# Patient Record
Sex: MALE | Race: WHITE | NOT HISPANIC OR LATINO | Employment: FULL TIME | URBAN - METROPOLITAN AREA
[De-identification: names, ages, dates, MRNs, and addresses within clinical notes are randomized per-mention and may not be internally consistent; named-entity substitution may affect disease eponyms.]

---

## 2023-03-08 DIAGNOSIS — E03.9 HYPOTHYROIDISM, UNSPECIFIED TYPE: ICD-10-CM

## 2023-03-08 RX ORDER — LEVOTHYROXINE SODIUM 200 UG/1
200 TABLET ORAL DAILY
COMMUNITY
Start: 2014-09-10 | End: 2023-03-08 | Stop reason: SDUPTHER

## 2023-03-09 RX ORDER — LEVOTHYROXINE SODIUM 200 UG/1
200 TABLET ORAL DAILY
Qty: 90 TABLET | Refills: 0 | Status: SHIPPED | OUTPATIENT
Start: 2023-03-09 | End: 2023-04-06 | Stop reason: SDUPTHER

## 2023-04-06 DIAGNOSIS — E03.9 HYPOTHYROIDISM, UNSPECIFIED TYPE: ICD-10-CM

## 2023-04-07 RX ORDER — LEVOTHYROXINE SODIUM 200 UG/1
200 TABLET ORAL DAILY
Qty: 90 TABLET | Refills: 2 | Status: SHIPPED | OUTPATIENT
Start: 2023-04-07 | End: 2023-04-07

## 2023-10-03 ENCOUNTER — PHARMACY VISIT (OUTPATIENT)
Dept: PHARMACY | Facility: CLINIC | Age: 63
End: 2023-10-03
Payer: COMMERCIAL

## 2023-10-03 PROCEDURE — RXMED WILLOW AMBULATORY MEDICATION CHARGE

## 2023-10-26 ENCOUNTER — PHARMACY VISIT (OUTPATIENT)
Dept: PHARMACY | Facility: CLINIC | Age: 63
End: 2023-10-26
Payer: COMMERCIAL

## 2023-10-26 DIAGNOSIS — J06.9 UPPER RESPIRATORY TRACT INFECTION, UNSPECIFIED TYPE: Primary | ICD-10-CM

## 2023-10-26 DIAGNOSIS — J06.9 UPPER RESPIRATORY TRACT INFECTION, UNSPECIFIED TYPE: ICD-10-CM

## 2023-10-26 PROCEDURE — RXMED WILLOW AMBULATORY MEDICATION CHARGE

## 2023-10-26 RX ORDER — AMOXICILLIN AND CLAVULANATE POTASSIUM 875; 125 MG/1; MG/1
875 TABLET, FILM COATED ORAL 2 TIMES DAILY
Qty: 14 TABLET | Refills: 0 | Status: SHIPPED | OUTPATIENT
Start: 2023-10-26 | End: 2023-11-02

## 2023-11-11 ENCOUNTER — PHARMACY VISIT (OUTPATIENT)
Dept: PHARMACY | Facility: CLINIC | Age: 63
End: 2023-11-11
Payer: COMMERCIAL

## 2023-11-12 ENCOUNTER — PHARMACY VISIT (OUTPATIENT)
Dept: PHARMACY | Facility: CLINIC | Age: 63
End: 2023-11-12
Payer: COMMERCIAL

## 2023-11-12 PROCEDURE — RXMED WILLOW AMBULATORY MEDICATION CHARGE

## 2023-12-24 DIAGNOSIS — E03.9 HYPOTHYROIDISM, UNSPECIFIED TYPE: ICD-10-CM

## 2023-12-26 PROCEDURE — RXMED WILLOW AMBULATORY MEDICATION CHARGE

## 2023-12-26 RX ORDER — LEVOTHYROXINE SODIUM 200 UG/1
200 TABLET ORAL DAILY
Qty: 90 TABLET | Refills: 0 | Status: SHIPPED | OUTPATIENT
Start: 2023-12-26 | End: 2024-01-18 | Stop reason: SDUPTHER

## 2023-12-27 ENCOUNTER — PHARMACY VISIT (OUTPATIENT)
Dept: PHARMACY | Facility: CLINIC | Age: 63
End: 2023-12-27
Payer: COMMERCIAL

## 2024-01-02 DIAGNOSIS — I10 PRIMARY HYPERTENSION: Primary | ICD-10-CM

## 2024-01-02 PROCEDURE — RXMED WILLOW AMBULATORY MEDICATION CHARGE

## 2024-01-02 RX ORDER — LISINOPRIL 20 MG/1
20 TABLET ORAL DAILY
Qty: 30 TABLET | Refills: 0 | Status: SHIPPED | OUTPATIENT
Start: 2024-01-02 | End: 2024-01-18 | Stop reason: SDUPTHER

## 2024-01-03 ENCOUNTER — PHARMACY VISIT (OUTPATIENT)
Dept: PHARMACY | Facility: CLINIC | Age: 64
End: 2024-01-03
Payer: COMMERCIAL

## 2024-01-03 DIAGNOSIS — E03.9 HYPOTHYROIDISM, UNSPECIFIED TYPE: ICD-10-CM

## 2024-01-03 DIAGNOSIS — I10 PRIMARY HYPERTENSION: ICD-10-CM

## 2024-01-03 RX ORDER — ATORVASTATIN CALCIUM 20 MG/1
20 TABLET, FILM COATED ORAL DAILY
Qty: 90 TABLET | Refills: 3 | OUTPATIENT
Start: 2024-01-03 | End: 2025-01-01

## 2024-01-03 RX ORDER — LISINOPRIL 20 MG/1
20 TABLET ORAL DAILY
Qty: 30 TABLET | Refills: 0 | OUTPATIENT
Start: 2024-01-03 | End: 2025-01-01

## 2024-01-03 RX ORDER — LEVOTHYROXINE SODIUM 200 UG/1
200 TABLET ORAL DAILY
Qty: 90 TABLET | Refills: 0 | OUTPATIENT
Start: 2024-01-03 | End: 2025-01-02

## 2024-01-18 ENCOUNTER — PHARMACY VISIT (OUTPATIENT)
Dept: PHARMACY | Facility: CLINIC | Age: 64
End: 2024-01-18
Payer: COMMERCIAL

## 2024-01-18 ENCOUNTER — OFFICE VISIT (OUTPATIENT)
Dept: PRIMARY CARE | Facility: CLINIC | Age: 64
End: 2024-01-18
Payer: COMMERCIAL

## 2024-01-18 VITALS
BODY MASS INDEX: 23.98 KG/M2 | SYSTOLIC BLOOD PRESSURE: 127 MMHG | DIASTOLIC BLOOD PRESSURE: 76 MMHG | OXYGEN SATURATION: 96 % | HEART RATE: 86 BPM | WEIGHT: 177 LBS | HEIGHT: 72 IN

## 2024-01-18 DIAGNOSIS — E03.9 HYPOTHYROIDISM, UNSPECIFIED TYPE: ICD-10-CM

## 2024-01-18 DIAGNOSIS — E55.9 MILD VITAMIN D DEFICIENCY: ICD-10-CM

## 2024-01-18 DIAGNOSIS — R73.01 IMPAIRED FASTING GLUCOSE: ICD-10-CM

## 2024-01-18 DIAGNOSIS — Z12.5 PROSTATE CANCER SCREENING: ICD-10-CM

## 2024-01-18 DIAGNOSIS — Z12.11 COLON CANCER SCREENING: ICD-10-CM

## 2024-01-18 DIAGNOSIS — E78.5 DYSLIPIDEMIA: ICD-10-CM

## 2024-01-18 DIAGNOSIS — K40.90 INGUINAL HERNIA OF RIGHT SIDE WITHOUT OBSTRUCTION OR GANGRENE: ICD-10-CM

## 2024-01-18 DIAGNOSIS — I10 PRIMARY HYPERTENSION: Primary | ICD-10-CM

## 2024-01-18 PROCEDURE — RXMED WILLOW AMBULATORY MEDICATION CHARGE

## 2024-01-18 PROCEDURE — 99214 OFFICE O/P EST MOD 30 MIN: CPT | Performed by: STUDENT IN AN ORGANIZED HEALTH CARE EDUCATION/TRAINING PROGRAM

## 2024-01-18 PROCEDURE — 3074F SYST BP LT 130 MM HG: CPT | Performed by: STUDENT IN AN ORGANIZED HEALTH CARE EDUCATION/TRAINING PROGRAM

## 2024-01-18 PROCEDURE — 3008F BODY MASS INDEX DOCD: CPT | Performed by: STUDENT IN AN ORGANIZED HEALTH CARE EDUCATION/TRAINING PROGRAM

## 2024-01-18 PROCEDURE — 3078F DIAST BP <80 MM HG: CPT | Performed by: STUDENT IN AN ORGANIZED HEALTH CARE EDUCATION/TRAINING PROGRAM

## 2024-01-18 PROCEDURE — G2211 COMPLEX E/M VISIT ADD ON: HCPCS | Performed by: STUDENT IN AN ORGANIZED HEALTH CARE EDUCATION/TRAINING PROGRAM

## 2024-01-18 RX ORDER — LISINOPRIL 20 MG/1
20 TABLET ORAL DAILY
Qty: 90 TABLET | Refills: 3 | Status: SHIPPED | OUTPATIENT
Start: 2024-01-18 | End: 2025-01-16

## 2024-01-18 RX ORDER — LEVOTHYROXINE SODIUM 200 UG/1
200 TABLET ORAL DAILY
Qty: 90 TABLET | Refills: 3 | Status: SHIPPED | OUTPATIENT
Start: 2024-01-18 | End: 2025-01-17

## 2024-01-18 RX ORDER — ATORVASTATIN CALCIUM 20 MG/1
20 TABLET, FILM COATED ORAL DAILY
Qty: 90 TABLET | Refills: 3 | Status: SHIPPED | OUTPATIENT
Start: 2024-01-18 | End: 2024-06-03 | Stop reason: DRUGHIGH

## 2024-01-29 ENCOUNTER — PHARMACY VISIT (OUTPATIENT)
Dept: PHARMACY | Facility: CLINIC | Age: 64
End: 2024-01-29
Payer: COMMERCIAL

## 2024-01-29 ENCOUNTER — LAB (OUTPATIENT)
Dept: LAB | Facility: LAB | Age: 64
End: 2024-01-29
Payer: COMMERCIAL

## 2024-01-29 DIAGNOSIS — E55.9 MILD VITAMIN D DEFICIENCY: ICD-10-CM

## 2024-01-29 DIAGNOSIS — R73.01 IMPAIRED FASTING GLUCOSE: ICD-10-CM

## 2024-01-29 DIAGNOSIS — E03.9 HYPOTHYROIDISM, UNSPECIFIED TYPE: ICD-10-CM

## 2024-01-29 DIAGNOSIS — E78.5 DYSLIPIDEMIA: ICD-10-CM

## 2024-01-29 DIAGNOSIS — Z12.11 COLON CANCER SCREENING: ICD-10-CM

## 2024-01-29 DIAGNOSIS — Z12.5 PROSTATE CANCER SCREENING: ICD-10-CM

## 2024-01-29 LAB
25(OH)D3 SERPL-MCNC: 48 NG/ML (ref 30–100)
ALBUMIN SERPL BCP-MCNC: 4.6 G/DL (ref 3.4–5)
ALP SERPL-CCNC: 55 U/L (ref 33–136)
ALT SERPL W P-5'-P-CCNC: 29 U/L (ref 7–52)
ANION GAP SERPL CALC-SCNC: 13 MMOL/L (ref 10–20)
AST SERPL W P-5'-P-CCNC: 22 U/L (ref 9–39)
BILIRUB SERPL-MCNC: 0.5 MG/DL (ref 0–1.2)
BUN SERPL-MCNC: 21 MG/DL (ref 6–23)
CALCIUM SERPL-MCNC: 10.9 MG/DL (ref 8.6–10.6)
CHLORIDE SERPL-SCNC: 101 MMOL/L (ref 98–107)
CHOLEST SERPL-MCNC: 161 MG/DL (ref 0–199)
CHOLESTEROL/HDL RATIO: 2.9
CO2 SERPL-SCNC: 29 MMOL/L (ref 21–32)
CREAT SERPL-MCNC: 1.13 MG/DL (ref 0.5–1.3)
EGFRCR SERPLBLD CKD-EPI 2021: 54 ML/MIN/1.73M*2
ERYTHROCYTE [DISTWIDTH] IN BLOOD BY AUTOMATED COUNT: 12.5 % (ref 11.5–14.5)
EST. AVERAGE GLUCOSE BLD GHB EST-MCNC: 100 MG/DL
GLUCOSE SERPL-MCNC: 102 MG/DL (ref 74–99)
HBA1C MFR BLD: 5.1 %
HCT VFR BLD AUTO: 43.9 % (ref 36–52)
HDLC SERPL-MCNC: 55.5 MG/DL
HGB BLD-MCNC: 14.8 G/DL (ref 12–17.5)
LDLC SERPL CALC-MCNC: 86 MG/DL
MCH RBC QN AUTO: 31.8 PG (ref 26–34)
MCHC RBC AUTO-ENTMCNC: 33.7 G/DL (ref 32–36)
MCV RBC AUTO: 94 FL (ref 80–100)
NON HDL CHOLESTEROL: 106 MG/DL (ref 0–149)
NRBC BLD-RTO: 0 /100 WBCS (ref 0–0)
PLATELET # BLD AUTO: 271 X10*3/UL (ref 150–450)
POTASSIUM SERPL-SCNC: 4.8 MMOL/L (ref 3.5–5.3)
PROT SERPL-MCNC: 7.3 G/DL (ref 6.4–8.2)
RBC # BLD AUTO: 4.66 X10*6/UL (ref 4–5.9)
SODIUM SERPL-SCNC: 138 MMOL/L (ref 136–145)
TRIGL SERPL-MCNC: 96 MG/DL (ref 0–149)
TSH SERPL-ACNC: 1.17 MIU/L (ref 0.44–3.98)
VLDL: 19 MG/DL (ref 0–40)
WBC # BLD AUTO: 5.7 X10*3/UL (ref 4.4–11.3)

## 2024-01-29 PROCEDURE — RXMED WILLOW AMBULATORY MEDICATION CHARGE

## 2024-01-29 PROCEDURE — 82306 VITAMIN D 25 HYDROXY: CPT

## 2024-01-29 PROCEDURE — 84154 ASSAY OF PSA FREE: CPT

## 2024-01-29 PROCEDURE — 83036 HEMOGLOBIN GLYCOSYLATED A1C: CPT

## 2024-01-29 PROCEDURE — 85027 COMPLETE CBC AUTOMATED: CPT

## 2024-01-29 PROCEDURE — 36415 COLL VENOUS BLD VENIPUNCTURE: CPT

## 2024-01-29 PROCEDURE — 84443 ASSAY THYROID STIM HORMONE: CPT

## 2024-01-29 PROCEDURE — 80053 COMPREHEN METABOLIC PANEL: CPT

## 2024-01-29 PROCEDURE — 84153 ASSAY OF PSA TOTAL: CPT

## 2024-01-29 PROCEDURE — 80061 LIPID PANEL: CPT

## 2024-01-29 RX ORDER — POLYETHYLENE GLYCOL 3350, SODIUM SULFATE ANHYDROUS, SODIUM BICARBONATE, SODIUM CHLORIDE, POTASSIUM CHLORIDE 236; 22.74; 6.74; 5.86; 2.97 G/4L; G/4L; G/4L; G/4L; G/4L
4000 POWDER, FOR SOLUTION ORAL ONCE
Qty: 4000 ML | Refills: 0 | Status: SHIPPED | OUTPATIENT
Start: 2024-01-29 | End: 2024-01-30

## 2024-01-31 LAB
PSA FREE MFR SERPL: 33 %
PSA FREE SERPL-MCNC: 0.2 NG/ML
PSA SERPL IA-MCNC: 0.6 NG/ML (ref 0–4)

## 2024-01-31 PROCEDURE — RXMED WILLOW AMBULATORY MEDICATION CHARGE

## 2024-02-01 ENCOUNTER — PHARMACY VISIT (OUTPATIENT)
Dept: PHARMACY | Facility: CLINIC | Age: 64
End: 2024-02-01
Payer: COMMERCIAL

## 2024-02-05 ENCOUNTER — PHARMACY VISIT (OUTPATIENT)
Dept: PHARMACY | Facility: CLINIC | Age: 64
End: 2024-02-05
Payer: COMMERCIAL

## 2024-02-05 PROCEDURE — RXMED WILLOW AMBULATORY MEDICATION CHARGE

## 2024-02-05 RX ORDER — AMOXICILLIN 500 MG/1
CAPSULE ORAL
Qty: 20 CAPSULE | Refills: 3 | OUTPATIENT
Start: 2024-01-15

## 2024-02-15 ENCOUNTER — APPOINTMENT (OUTPATIENT)
Dept: SURGERY | Facility: CLINIC | Age: 64
End: 2024-02-15
Payer: COMMERCIAL

## 2024-02-22 ENCOUNTER — OFFICE VISIT (OUTPATIENT)
Dept: SURGERY | Facility: CLINIC | Age: 64
End: 2024-02-22
Payer: COMMERCIAL

## 2024-02-22 VITALS
TEMPERATURE: 98 F | SYSTOLIC BLOOD PRESSURE: 126 MMHG | HEART RATE: 78 BPM | DIASTOLIC BLOOD PRESSURE: 83 MMHG | BODY MASS INDEX: 23.91 KG/M2 | WEIGHT: 176.3 LBS

## 2024-02-22 DIAGNOSIS — K40.90 INGUINAL HERNIA OF RIGHT SIDE WITHOUT OBSTRUCTION OR GANGRENE: Primary | ICD-10-CM

## 2024-02-22 PROCEDURE — 1036F TOBACCO NON-USER: CPT | Performed by: SURGERY

## 2024-02-22 PROCEDURE — 3008F BODY MASS INDEX DOCD: CPT | Performed by: SURGERY

## 2024-02-22 PROCEDURE — 99213 OFFICE O/P EST LOW 20 MIN: CPT | Performed by: SURGERY

## 2024-02-22 RX ORDER — ASPIRIN 81 MG/1
TABLET ORAL
COMMUNITY

## 2024-02-22 ASSESSMENT — PAIN SCALES - GENERAL: PAINLEVEL: 0-NO PAIN

## 2024-02-22 NOTE — PROGRESS NOTES
"History Of Present Illness  Gurdeep Tracy \"Narayan\" is a 63 y.o. adult presenting with right inguinal hernia.  He works at  as art .  He is notices bulge over the last several months.  Only occasionally gives him difficulty with pain.  When he is bending over.  He has a colonoscopy scheduled.  He has no prostate symptoms.  No previous abdominal surgeries.  Otherwise good health.        Last Recorded Vitals  Blood pressure 126/83, pulse 78, temperature 36.7 °C (98 °F), weight 80 kg (176 lb 4.8 oz).  Physical Examination  Awake and alert normal respiration.  Abdomen is benign with no scars.  He has a right inguinal hernia.  No evidence of a left inguinal hernia.    Assessment/Plan right inguinal hernia.  I reviewed the hernia booklets with him.  Will plan a laparoscopic right inguinal hernia at his convenience.  Presently he is can wait and see when his good time.  There is no problems with waiting for period of time till he has more symptoms.  He will contact me when he wants to proceed with surgery.    Mauricio Delgado MD FACS  Professor of Surgery  Soledad Montez Chair in Surgical [address]  [facility] [employer]  [id number] [place] [address], [id number]  Phone [phone]  email: [email or web address]        "

## 2024-03-31 PROCEDURE — RXMED WILLOW AMBULATORY MEDICATION CHARGE

## 2024-04-01 ENCOUNTER — PHARMACY VISIT (OUTPATIENT)
Dept: PHARMACY | Facility: CLINIC | Age: 64
End: 2024-04-01
Payer: COMMERCIAL

## 2024-04-23 ENCOUNTER — HOSPITAL ENCOUNTER (OUTPATIENT)
Dept: RADIOLOGY | Facility: HOSPITAL | Age: 64
Discharge: HOME | End: 2024-04-23
Payer: COMMERCIAL

## 2024-04-23 DIAGNOSIS — E78.5 DYSLIPIDEMIA: ICD-10-CM

## 2024-04-23 PROCEDURE — 75571 CT HRT W/O DYE W/CA TEST: CPT

## 2024-04-30 PROCEDURE — RXMED WILLOW AMBULATORY MEDICATION CHARGE

## 2024-05-01 ENCOUNTER — PHARMACY VISIT (OUTPATIENT)
Dept: PHARMACY | Facility: CLINIC | Age: 64
End: 2024-05-01
Payer: COMMERCIAL

## 2024-05-03 ENCOUNTER — OFFICE VISIT (OUTPATIENT)
Dept: GASTROENTEROLOGY | Facility: EXTERNAL LOCATION | Age: 64
End: 2024-05-03
Payer: COMMERCIAL

## 2024-05-03 DIAGNOSIS — Z12.11 COLON CANCER SCREENING: Primary | ICD-10-CM

## 2024-05-03 DIAGNOSIS — Z86.010 HX OF COLONIC POLYPS: ICD-10-CM

## 2024-05-03 DIAGNOSIS — Z12.11 COLON CANCER SCREENING: ICD-10-CM

## 2024-05-03 DIAGNOSIS — Z80.0 FAMILY HISTORY OF COLON CANCER: ICD-10-CM

## 2024-05-03 PROCEDURE — 3008F BODY MASS INDEX DOCD: CPT | Performed by: INTERNAL MEDICINE

## 2024-05-03 PROCEDURE — 45378 DIAGNOSTIC COLONOSCOPY: CPT | Performed by: INTERNAL MEDICINE

## 2024-05-03 NOTE — PROGRESS NOTES
Patient had colonoscopy for history of polyps and family history showing no recurrent polyps.  He should have a repeat in 5 years.

## 2024-05-20 PROCEDURE — RXMED WILLOW AMBULATORY MEDICATION CHARGE

## 2024-05-21 ENCOUNTER — PHARMACY VISIT (OUTPATIENT)
Dept: PHARMACY | Facility: CLINIC | Age: 64
End: 2024-05-21
Payer: COMMERCIAL

## 2024-06-03 ENCOUNTER — OFFICE VISIT (OUTPATIENT)
Dept: CARDIOLOGY | Facility: HOSPITAL | Age: 64
End: 2024-06-03
Payer: COMMERCIAL

## 2024-06-03 VITALS
SYSTOLIC BLOOD PRESSURE: 125 MMHG | DIASTOLIC BLOOD PRESSURE: 80 MMHG | BODY MASS INDEX: 23.54 KG/M2 | HEART RATE: 70 BPM | OXYGEN SATURATION: 96 % | HEIGHT: 72 IN | WEIGHT: 173.8 LBS

## 2024-06-03 DIAGNOSIS — R55 SYNCOPE AND COLLAPSE: Primary | ICD-10-CM

## 2024-06-03 DIAGNOSIS — E78.5 DYSLIPIDEMIA: ICD-10-CM

## 2024-06-03 PROCEDURE — 99214 OFFICE O/P EST MOD 30 MIN: CPT | Performed by: INTERNAL MEDICINE

## 2024-06-03 PROCEDURE — 93005 ELECTROCARDIOGRAM TRACING: CPT | Performed by: INTERNAL MEDICINE

## 2024-06-03 PROCEDURE — 3008F BODY MASS INDEX DOCD: CPT | Performed by: INTERNAL MEDICINE

## 2024-06-03 PROCEDURE — 1036F TOBACCO NON-USER: CPT | Performed by: INTERNAL MEDICINE

## 2024-06-03 RX ORDER — ATORVASTATIN CALCIUM 20 MG/1
10 TABLET, FILM COATED ORAL DAILY
Qty: 180 TABLET | Refills: 0 | Status: SHIPPED | OUTPATIENT
Start: 2024-06-03 | End: 2025-06-02

## 2024-06-03 ASSESSMENT — PAIN SCALES - GENERAL: PAINLEVEL: 0-NO PAIN

## 2024-06-03 NOTE — PROGRESS NOTES
Chief Complaint:   Shortness of Breath and Syncope (Passed out on Friday.)     History Of Present Illness:    Narayan Tracy is a 63 y.o. adult presenting with history of syncope.  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  He remembers standing up but does not remember actually falling having hit his head with a small hematoma.  His wife came came around immediately, to find him sitting up, trying to gather himself.  Within the few minutes he is back to his usual self.  His lisinopril dose was reduced to 10 mg daily from 20 mg,     He admits to not doing regular workouts as he used to do in the past and has some general tiredness.  He is hypothyroid on replacement therapy within normal TSH.     Last Recorded Vitals:  Vitals:    06/03/24 1526   BP: 125/80   BP Location: Left arm   Patient Position: Sitting   Pulse: 70   SpO2: 96%   Weight: 78.8 kg (173 lb 12.8 oz)   Height: 1.829 m (6')       Past Medical History:  He has a past medical history of Other amnesia (04/28/2017), Other specified abnormal findings of blood chemistry (04/28/2017), Recurrent acute iridocyclitis, unspecified eye, Scoliosis, unspecified (04/28/2017), Syncope and collapse (04/28/2017), and Unspecified atrial fibrillation (Multi) (04/28/2017).    Past Surgical History:  He has no past surgical history on file.      Social History:  He reports that he has quit smoking. His smoking use included cigarettes. He has never been exposed to tobacco smoke. He has never used smokeless tobacco. He reports current alcohol use. He reports that he does not use drugs.    Family History:  No family history on file.     Allergies:  Patient has no known allergies.    Outpatient Medications:  Current Outpatient Medications   Medication Instructions    amoxicillin (Amoxil) 500 mg capsule Take 4  capsules by mouth 1 hour before appointment    aspirin 81 mg EC tablet No dose, route, or frequency recorded.    atorvastatin (LIPITOR) 10 mg, oral, Daily    levothyroxine (Synthroid, Levoxyl) 200 mcg tablet TAKE 1 TABLET BY MOUTH ONCE DAILY    lisinopril 20 mg tablet TAKE ONE TABLET BY MOUTH DAILY     Vitals:  HR 70 /80 Wt 78.8 kg/ 173 lb BMI 23.57    Physical Exam:  On examination, he was comfortably sitting.  There was no postural fall in blood pressure HEENT: normal, Neck: supple, Carotids: brisk upstroke, JVP: normal, CVS: Rate and rhythm regular, S1S2, Chest: CTAB, Abdomen: soft nontender, no organomegaly appreciated, Extremities: without any edema, CNS: HMF normal, no focal neurological deficit.     Data:  All available data were personally reviewed by me.    Last Labs:  CBC -  Lab Results   Component Value Date    WBC 5.7 01/29/2024    HGB 14.8 01/29/2024    HCT 43.9 01/29/2024    MCV 94 01/29/2024     01/29/2024       CMP -  Lab Results   Component Value Date    CALCIUM 10.9 (H) 01/29/2024    PHOS 3.4 08/31/2022    PROT 7.3 01/29/2024    ALBUMIN 4.6 01/29/2024    AST 22 01/29/2024    ALT 29 01/29/2024    ALKPHOS 55 01/29/2024    BILITOT 0.5 01/29/2024       LIPID PANEL -   Lab Results   Component Value Date    CHOL 161 01/29/2024    TRIG 96 01/29/2024    HDL 55.5 01/29/2024    CHHDL 2.9 01/29/2024    LDLF 86 01/19/2023    VLDL 19 01/29/2024    NHDL 106 01/29/2024       RENAL FUNCTION PANEL -   Lab Results   Component Value Date    GLUCOSE 102 (H) 01/29/2024     01/29/2024    K 4.8 01/29/2024     01/29/2024    CO2 29 01/29/2024    ANIONGAP 13 01/29/2024    BUN 21 01/29/2024    CREATININE 1.13 01/29/2024    GFRMALE 70 01/19/2023    CALCIUM 10.9 (H) 01/29/2024    PHOS 3.4 08/31/2022    ALBUMIN 4.6 01/29/2024        Lab Results   Component Value Date    HGBA1C 5.1 01/29/2024       Last Cardiology Tests:  ECG:  Done today was within normal limits.      Cardiac Imaging:  CT cardiac  scoring wo IV contrast 04/23/2024  LM 0    LCx 0  RCA 0,      Total 372          Assessment/Plan   DIAGNOSES: Syncope- Possibly vasovagal. Clinically normal CVS with normal ECG.  One remote episode of PAF (Related to an episode of alcohol intake). Essential HTN. Dyslipidemia. Hypothyroidism on replacement therapy.     I reassured him from a cardiac perspective.  I agree with reduction of lisinopril dose to 10 mg daily.  I have requested a plain CT scan head, to rule out any bleed. I also ordered a treadmill stress echo and an event monitor.  I encouraged him to continue with the normal activities as tolerated.  I also stressed the importance of keeping well-hydrated, especially in the summer weather.        Jose Meadows MD

## 2024-06-04 ENCOUNTER — HOSPITAL ENCOUNTER (OUTPATIENT)
Dept: CARDIOLOGY | Facility: HOSPITAL | Age: 64
Discharge: HOME | End: 2024-06-04
Payer: COMMERCIAL

## 2024-06-04 DIAGNOSIS — R55 SYNCOPE AND COLLAPSE: ICD-10-CM

## 2024-06-14 LAB
ATRIAL RATE: 62 BPM
P AXIS: -22 DEGREES
P OFFSET: 199 MS
P ONSET: 155 MS
PR INTERVAL: 136 MS
Q ONSET: 223 MS
QRS COUNT: 10 BEATS
QRS DURATION: 80 MS
QT INTERVAL: 402 MS
QTC CALCULATION(BAZETT): 408 MS
QTC FREDERICIA: 406 MS
R AXIS: 70 DEGREES
T AXIS: 55 DEGREES
T OFFSET: 424 MS
VENTRICULAR RATE: 62 BPM

## 2024-06-18 ENCOUNTER — HOSPITAL ENCOUNTER (OUTPATIENT)
Dept: RADIOLOGY | Facility: HOSPITAL | Age: 64
Discharge: HOME | End: 2024-06-18
Payer: COMMERCIAL

## 2024-06-18 DIAGNOSIS — J01.90 ACUTE BACTERIAL SINUSITIS: Primary | ICD-10-CM

## 2024-06-18 DIAGNOSIS — B96.89 ACUTE BACTERIAL SINUSITIS: Primary | ICD-10-CM

## 2024-06-18 DIAGNOSIS — R55 SYNCOPE AND COLLAPSE: ICD-10-CM

## 2024-06-18 PROCEDURE — 70450 CT HEAD/BRAIN W/O DYE: CPT

## 2024-06-18 PROCEDURE — 70450 CT HEAD/BRAIN W/O DYE: CPT | Performed by: RADIOLOGY

## 2024-06-18 RX ORDER — AMOXICILLIN AND CLAVULANATE POTASSIUM 875; 125 MG/1; MG/1
875 TABLET, FILM COATED ORAL 2 TIMES DAILY
Qty: 14 TABLET | Refills: 0 | Status: SHIPPED | OUTPATIENT
Start: 2024-06-18 | End: 2024-06-26

## 2024-06-19 ENCOUNTER — HOSPITAL ENCOUNTER (OUTPATIENT)
Dept: CARDIOLOGY | Facility: HOSPITAL | Age: 64
Discharge: HOME | End: 2024-06-19
Payer: COMMERCIAL

## 2024-06-19 ENCOUNTER — PHARMACY VISIT (OUTPATIENT)
Dept: PHARMACY | Facility: CLINIC | Age: 64
End: 2024-06-19
Payer: COMMERCIAL

## 2024-06-19 ENCOUNTER — HOSPITAL ENCOUNTER (OUTPATIENT)
Dept: CARDIOLOGY | Facility: HOSPITAL | Age: 64
End: 2024-06-19
Payer: COMMERCIAL

## 2024-06-19 DIAGNOSIS — R55 SYNCOPE AND COLLAPSE: ICD-10-CM

## 2024-06-19 PROCEDURE — 93018 CV STRESS TEST I&R ONLY: CPT | Performed by: INTERNAL MEDICINE

## 2024-06-19 PROCEDURE — 93017 CV STRESS TEST TRACING ONLY: CPT

## 2024-06-19 PROCEDURE — RXMED WILLOW AMBULATORY MEDICATION CHARGE

## 2024-06-19 PROCEDURE — 93350 STRESS TTE ONLY: CPT | Performed by: INTERNAL MEDICINE

## 2024-06-19 PROCEDURE — 93016 CV STRESS TEST SUPVJ ONLY: CPT | Performed by: INTERNAL MEDICINE

## 2024-06-29 PROCEDURE — RXMED WILLOW AMBULATORY MEDICATION CHARGE

## 2024-07-01 ENCOUNTER — PHARMACY VISIT (OUTPATIENT)
Dept: PHARMACY | Facility: CLINIC | Age: 64
End: 2024-07-01
Payer: COMMERCIAL

## 2024-07-24 ENCOUNTER — PHARMACY VISIT (OUTPATIENT)
Dept: PHARMACY | Facility: CLINIC | Age: 64
End: 2024-07-24
Payer: COMMERCIAL

## 2024-07-24 PROCEDURE — RXMED WILLOW AMBULATORY MEDICATION CHARGE

## 2024-08-25 PROCEDURE — RXMED WILLOW AMBULATORY MEDICATION CHARGE

## 2024-08-30 ENCOUNTER — PHARMACY VISIT (OUTPATIENT)
Dept: PHARMACY | Facility: CLINIC | Age: 64
End: 2024-08-30
Payer: COMMERCIAL

## 2024-09-23 ENCOUNTER — PHARMACY VISIT (OUTPATIENT)
Dept: PHARMACY | Facility: CLINIC | Age: 64
End: 2024-09-23
Payer: COMMERCIAL

## 2024-09-23 PROCEDURE — RXMED WILLOW AMBULATORY MEDICATION CHARGE

## 2024-10-07 DIAGNOSIS — E78.5 DYSLIPIDEMIA: ICD-10-CM

## 2024-10-07 RX ORDER — ATORVASTATIN CALCIUM 20 MG/1
20 TABLET, FILM COATED ORAL DAILY
Qty: 90 TABLET | Refills: 3 | Status: SHIPPED | OUTPATIENT
Start: 2024-10-07 | End: 2025-10-02

## 2024-10-17 PROCEDURE — RXMED WILLOW AMBULATORY MEDICATION CHARGE

## 2024-10-24 ENCOUNTER — PHARMACY VISIT (OUTPATIENT)
Dept: PHARMACY | Facility: CLINIC | Age: 64
End: 2024-10-24
Payer: COMMERCIAL

## 2024-12-23 PROCEDURE — RXMED WILLOW AMBULATORY MEDICATION CHARGE

## 2024-12-26 ENCOUNTER — PHARMACY VISIT (OUTPATIENT)
Dept: PHARMACY | Facility: CLINIC | Age: 64
End: 2024-12-26
Payer: COMMERCIAL

## 2025-03-28 ENCOUNTER — APPOINTMENT (OUTPATIENT)
Dept: PRIMARY CARE | Facility: CLINIC | Age: 65
End: 2025-03-28
Payer: COMMERCIAL

## 2025-03-28 VITALS
OXYGEN SATURATION: 96 % | HEART RATE: 90 BPM | DIASTOLIC BLOOD PRESSURE: 85 MMHG | BODY MASS INDEX: 24.11 KG/M2 | SYSTOLIC BLOOD PRESSURE: 117 MMHG | WEIGHT: 177.8 LBS

## 2025-03-28 DIAGNOSIS — Z12.5 PROSTATE CANCER SCREENING: ICD-10-CM

## 2025-03-28 DIAGNOSIS — K40.90 INGUINAL HERNIA OF RIGHT SIDE WITHOUT OBSTRUCTION OR GANGRENE: ICD-10-CM

## 2025-03-28 DIAGNOSIS — E78.5 DYSLIPIDEMIA: ICD-10-CM

## 2025-03-28 DIAGNOSIS — E83.52 HYPERCALCEMIA: ICD-10-CM

## 2025-03-28 DIAGNOSIS — Z23 IMMUNIZATION DUE: ICD-10-CM

## 2025-03-28 DIAGNOSIS — E03.9 HYPOTHYROIDISM, UNSPECIFIED TYPE: ICD-10-CM

## 2025-03-28 DIAGNOSIS — E55.9 MILD VITAMIN D DEFICIENCY: ICD-10-CM

## 2025-03-28 DIAGNOSIS — R73.01 IMPAIRED FASTING GLUCOSE: ICD-10-CM

## 2025-03-28 DIAGNOSIS — I10 PRIMARY HYPERTENSION: Primary | ICD-10-CM

## 2025-03-28 DIAGNOSIS — Z13.6 ENCOUNTER FOR ABDOMINAL AORTIC ANEURYSM (AAA) SCREENING: ICD-10-CM

## 2025-03-28 DIAGNOSIS — Z00.00 HEALTH MAINTENANCE EXAMINATION: ICD-10-CM

## 2025-03-28 PROBLEM — I82.409 DVT (DEEP VENOUS THROMBOSIS) (MULTI): Status: ACTIVE | Noted: 2025-03-28

## 2025-03-28 PROBLEM — M54.10 RADICULAR PAIN: Status: ACTIVE | Noted: 2025-03-28

## 2025-03-28 PROBLEM — H91.20 SUDDEN-ONSET SENSORINEURAL HEARING LOSS: Status: ACTIVE | Noted: 2025-03-28

## 2025-03-28 PROBLEM — L82.1 OTHER SEBORRHEIC KERATOSIS: Status: ACTIVE | Noted: 2018-10-17

## 2025-03-28 PROBLEM — M54.9 BACK PAIN: Status: ACTIVE | Noted: 2025-03-28

## 2025-03-28 PROBLEM — M35.9 CONNECTIVE TISSUE DISEASE, UNDIFFERENTIATED (MULTI): Status: ACTIVE | Noted: 2018-01-08

## 2025-03-28 PROBLEM — H25.12 AGE-RELATED NUCLEAR CATARACT OF LEFT EYE: Status: ACTIVE | Noted: 2025-03-28

## 2025-03-28 PROBLEM — H20.9 IRITIS: Status: ACTIVE | Noted: 2025-03-28

## 2025-03-28 PROBLEM — H93.19 TINNITUS: Status: ACTIVE | Noted: 2025-03-28

## 2025-03-28 PROBLEM — M25.519 SHOULDER PAIN: Status: ACTIVE | Noted: 2025-03-28

## 2025-03-28 PROBLEM — B02.9 SHINGLES: Status: ACTIVE | Noted: 2025-03-28

## 2025-03-28 PROBLEM — M54.12 CERVICAL MYELOPATHY WITH CERVICAL RADICULOPATHY: Status: ACTIVE | Noted: 2025-03-28

## 2025-03-28 PROBLEM — H90.3 SENSORINEURAL HEARING LOSS (SNHL) OF BOTH EARS: Status: ACTIVE | Noted: 2025-03-28

## 2025-03-28 PROBLEM — B36.0 TINEA VERSICOLOR: Status: ACTIVE | Noted: 2025-03-28

## 2025-03-28 PROBLEM — H43.391 VITREOUS FLOATERS OF RIGHT EYE: Status: ACTIVE | Noted: 2018-05-01

## 2025-03-28 PROBLEM — G95.9 CERVICAL MYELOPATHY WITH CERVICAL RADICULOPATHY: Status: ACTIVE | Noted: 2025-03-28

## 2025-03-28 PROBLEM — H91.93 BILATERAL HEARING LOSS: Status: ACTIVE | Noted: 2025-03-28

## 2025-03-28 PROBLEM — Z96.1 PSEUDOPHAKIA OF RIGHT EYE: Status: ACTIVE | Noted: 2018-05-01

## 2025-03-28 PROCEDURE — 90677 PCV20 VACCINE IM: CPT | Performed by: STUDENT IN AN ORGANIZED HEALTH CARE EDUCATION/TRAINING PROGRAM

## 2025-03-28 PROCEDURE — 3079F DIAST BP 80-89 MM HG: CPT | Performed by: STUDENT IN AN ORGANIZED HEALTH CARE EDUCATION/TRAINING PROGRAM

## 2025-03-28 PROCEDURE — 1159F MED LIST DOCD IN RCRD: CPT | Performed by: STUDENT IN AN ORGANIZED HEALTH CARE EDUCATION/TRAINING PROGRAM

## 2025-03-28 PROCEDURE — 90471 IMMUNIZATION ADMIN: CPT | Performed by: STUDENT IN AN ORGANIZED HEALTH CARE EDUCATION/TRAINING PROGRAM

## 2025-03-28 PROCEDURE — 99397 PER PM REEVAL EST PAT 65+ YR: CPT | Performed by: STUDENT IN AN ORGANIZED HEALTH CARE EDUCATION/TRAINING PROGRAM

## 2025-03-28 PROCEDURE — RXMED WILLOW AMBULATORY MEDICATION CHARGE

## 2025-03-28 PROCEDURE — 3074F SYST BP LT 130 MM HG: CPT | Performed by: STUDENT IN AN ORGANIZED HEALTH CARE EDUCATION/TRAINING PROGRAM

## 2025-03-28 PROCEDURE — 1036F TOBACCO NON-USER: CPT | Performed by: STUDENT IN AN ORGANIZED HEALTH CARE EDUCATION/TRAINING PROGRAM

## 2025-03-28 PROCEDURE — G2211 COMPLEX E/M VISIT ADD ON: HCPCS | Performed by: STUDENT IN AN ORGANIZED HEALTH CARE EDUCATION/TRAINING PROGRAM

## 2025-03-28 PROCEDURE — 1126F AMNT PAIN NOTED NONE PRSNT: CPT | Performed by: STUDENT IN AN ORGANIZED HEALTH CARE EDUCATION/TRAINING PROGRAM

## 2025-03-28 RX ORDER — LISINOPRIL 10 MG/1
10 TABLET ORAL DAILY
Qty: 90 TABLET | Refills: 3 | Status: SHIPPED | OUTPATIENT
Start: 2025-03-28 | End: 2026-03-23

## 2025-03-28 ASSESSMENT — PATIENT HEALTH QUESTIONNAIRE - PHQ9
1. LITTLE INTEREST OR PLEASURE IN DOING THINGS: NOT AT ALL
2. FEELING DOWN, DEPRESSED OR HOPELESS: NOT AT ALL
SUM OF ALL RESPONSES TO PHQ9 QUESTIONS 1 AND 2: 0

## 2025-03-28 ASSESSMENT — ENCOUNTER SYMPTOMS
LOSS OF SENSATION IN FEET: 0
DEPRESSION: 0
OCCASIONAL FEELINGS OF UNSTEADINESS: 0

## 2025-03-28 ASSESSMENT — PAIN SCALES - GENERAL: PAINLEVEL_OUTOF10: 0-NO PAIN

## 2025-03-28 NOTE — PATIENT INSTRUCTIONS
Fasting labs at [facility] next week  Including in that the PTH, or parathyroid hormone, to follow up your slightly elevated calcium level  If abnormal, will discuss next steps which would likely include a bone density scan and urine studies    AAA (abdominal aortic aneurysm) screening  Stop in [address] to schedule  One time test so long as negative    Pneumonia shot today     Lisinopril 10mg tablet sent to [facility]    Follow up with Dermatology, ENT/Audiology, and Ophthalmology as planned    Annual visits should be fine unless things evolve with the calcium such that we need a follow up visit!     All the best,  Dr. BANERJEE

## 2025-03-28 NOTE — PROGRESS NOTES
"History Of Present Illness:  Gurdeep Tracy \"Narayan\" is a 64 y.o. adult whom presents to me to reestablish ENT care.     Seen today to re-establish care. He feels that his hearing is getting worse. He is having more trouble in social settings and loud environments. Has tinnitus but less bothersome now. Feels like his voice is very loud in his head. Feels his hearing is muffled on the left.     Recall 10/24/22:  Gurdeep is a 60 yo male w/ a hx of left-sided sudden onset SNHL. He notes his hearing has been stable since his last visit. He does notice more bass on the left when wearing his ear buds. His tinnitus does change in pitch, especially when moving his jaw around. He's noticed ear popping while driving, but denies any hearing improvement. He denies any reverb.      Past Medical History:  He has a past medical history of Other amnesia (04/28/2017), Other specified abnormal findings of blood chemistry (04/28/2017), Recurrent acute iridocyclitis, unspecified eye, Scoliosis, unspecified (04/28/2017), Syncope and collapse (04/28/2017), and Unspecified atrial fibrillation (Multi) (04/28/2017).    Surgical History:  He has no past surgical history on file.     Social History:  He reports that he has quit smoking. His smoking use included cigarettes. He has never been exposed to tobacco smoke. He has never used smokeless tobacco. He reports current alcohol use. He reports that he does not use drugs.    Family History:  No family history on file.     Medications:  Current Outpatient Medications   Medication Instructions    amoxicillin (Amoxil) 500 mg capsule Take 4 capsules by mouth 1 hour before appointment    aspirin 81 mg EC tablet No dose, route, or frequency recorded.    atorvastatin (LIPITOR) 20 mg, oral, Daily    levothyroxine (SYNTHROID, LEVOXYL) 200 mcg, oral, Daily, NEED TO COMPLETE LABS FOR ADDITIONAL REFILLS.    lisinopril 10 mg, oral, Daily        Allergies:  Patient has no known allergies.    Review of Systems: "   A comprehensive 10-point review of systems was obtained including constitutional, neurological, HEENT, pulmonary, cardiovascular, genito-urinary, and other pertinent systems and was negative except as noted in the HPI.      Physical Exam:  Constitutional   General appearance: Healthy-appearing, well-nourished, well groomed, in no acute distress.   Ability to communicate: Normal communication without aids, normal voice quality.       Head and face: Atraumatic with no masses, lesions, or scarring.   Facial strength: Normal strength and symmetry, no synkinesis or facial tic.     Ears  Otoscopic examination: Bilateral normal otoscopy of the tympanic membrane     Nose: Dorsum symmetric with no visible or palpable deformities.     Oral Cavity/Mouth  Lips, teeth, and gums: Normal lips, gums, and dentition.     Oropharynx: Mucosa moist, no lesions.     Neck: Symmetrical, trachea midline. No masses visible.     Neurological/Psychiatric  Cranial Nerve Examination: II - XII grossly intact.  Orientation to person, place, and time: Normal.  Mood and affect: Normal.     Skin: Normal without rashes or lesions.     Pulmonary  Respiratory effort: Chest expands symmetrically.     Cardiovascular: Good peripheral pulses  Peripheral vascular system: No varicosities, carotid pulse normal, no edema. No jugular venous distension.     Extremities: Appearance of extremities: Normal. Gait normal.     Procedure:  none    Last Recorded Vitals:  There were no vitals taken for this visit.    Relevant Results:  Audiogram from 4/15/25 reviewed and is stable from prior with mild left sensorineural hearing loss primarily in the mid frequencies. WRS 96% on the left and 100% on the right.                Assessment/Plan   64 y.o. adult whom presents to me to reestablish ENT care. Has a history of sudden left sensorineural hearing loss s/p IT injections. He had normal MRI IAC at that time. Audiogram is stable today. He is interested in hearing aids and  is medically cleared for them. Resources provided. Follow up PRN.     Guillermo Bowling DO  Otolaryngology Resident (PGY-3)    I saw and evaluated the patient. I personally obtained the key and critical portions of the history and physical exam or was physically present for key and critical portions performed by the resident/fellow. I reviewed the resident/fellow's documentation and discussed the patient with the resident/fellow. I agree with the resident/fellow's medical decision making as documented in the note.    Selena Davies MD

## 2025-03-28 NOTE — PROGRESS NOTES
"Gurdeep Tracy is a 64 y.o. male seen in Clinic at [facility] [place] [facility] by Dr. Rui Lee on 03/28/25 for routine care, as well as for management of the following chronic medical conditions: DLD/CAD (elevated CAC scoring), unilateral hearing loss, HTN, Hypothyroidism. Patient presents today for CPE.     #R Inguinal Hernia   - seen by Dr. Delgado   - considering surgery but ongoing surveillance and defers any intervention for now     #Elevated CAC scoring, CAD, DLD   - score 267 in 2018; 372 per 2024 scan   - Moderate intensity statin, Atorva 20   - seen by cardiology in 2024 for syncope as below   [  ] updated labs; goal LDL <70; titrate Atorva if needed to achieve goal   - reassuring stress testing 2024  [  ] continue daily ASA     #Unilateral hearing loss 8.2022 s/p ENT referral, neg MRI IAC 9.22 with some recovery s/p steroid injxns with ENT (Keke)   [  ] consider hearing assistive devices; has follow up with audiology and ENT pending      #R shoulder OA, Cervical radiculopathy with prominent R sided symptoms early 2022 s/p referral to Dr. Dixon and subsequent cervical spine surgery 4.2022   - s/p C5-7 ACDF     #Herpes zoster 2020   - s/p Shingrix vaccine series       #HTN,  lone episode remote AF (iso alcohol intake), Syncope (thought to be vasovagal)  - Lisinopril 20mg daily   - Episode of syncope in 2024  - seen by cardiology, Dr. Meadows   - per cardiology note: \"getting up after squatting on the floor for couple of minutes.  He remembers standing up but does not remember actually falling having hit his head with a small hematoma. His wife came came around immediately, to find him sitting up, trying to gather himself.  Within the few minutes he is back to his usual self.  His lisinopril dose was reduced to 10 mg daily from 20 mg\"  - reassuring EKG per 2024 visit  - Holter reassuring 2024  - Stress echo reassuring 2024   [  ] labs  [  ] med refills     #HyperCa   - resolved per recent labs  [  ] updated CMP today, consider " PTH with labs--ordered today, if elevated or upper limit of normal, pursue urine testing and DEXA      #Hypothyroidism   - ltx 200 mcg   [  ] due labs   [  ] refill      #Chronic LBP      #Cataracts s/p phaco, Uveitis History     Past Medical History:   Past Medical History:   Diagnosis Date    Other amnesia 04/28/2017    Memory loss or impairment    Other specified abnormal findings of blood chemistry 04/28/2017    Increased homocysteine    Recurrent acute iridocyclitis, unspecified eye     Iritis, recurrent    Scoliosis, unspecified 04/28/2017    Scoliosis    Syncope and collapse 04/28/2017    Vasovagal syncope    Unspecified atrial fibrillation (Multi) 04/28/2017    Atrial fibrillation with RVR     Subspecialty Medical Care: Cardiology, Surgery, Ophthalmology     Past Surgical History:   No past surgical history on file.    Medications:   Current Outpatient Medications:     aspirin 81 mg EC tablet, , Disp: , Rfl:     atorvastatin (Lipitor) 20 mg tablet, Take 1 tablet (20 mg) by mouth once daily., Disp: 90 tablet, Rfl: 3    levothyroxine (Synthroid, Levoxyl) 200 mcg tablet, TAKE 1 TABLET BY MOUTH ONCE DAILY, Disp: 90 tablet, Rfl: 3    amoxicillin (Amoxil) 500 mg capsule, Take 4 capsules by mouth 1 hour before appointment, Disp: 20 capsule, Rfl: 3    lisinopril 10 mg tablet, Take 1 tablet (10 mg) by mouth once daily., Disp: 90 tablet, Rfl: 3  Pharmacy: [facility]     Allergies: NKDA   No Known Allergies    Immunizations:   - Influenza: advise annually  - COVID: advise staying UTD with boosters  - Tdap: due 2032  - Prevnar, Pneumovax: PCV-20 today   - Shingrix: series complete   - MMR: prior history of confirmed infection in childhood     Family History:   No family history on file.    Social History:   Home/Living Situation/Falls/Safety Assessment: lives at home with wife   Education/Employment/Work/Vocational: works at    Activities: regular physical activity   Drug Use: non-smoker   Diet: healthy dietary habits    Depression/Anxiety: no concerns   Sexuality/Contraception/Menstrual History:    Sleep: no concerns     Patient Information:  Health Insurance:  Employee  Transportation: Drives   Healthcare POA/Guardian: Wife   Contact Information: correct in EMR    Visit Vitals  /85 (BP Location: Right arm, Patient Position: Sitting, BP Cuff Size: Adult)   Pulse 90   Wt 80.6 kg (177 lb 12.8 oz)   SpO2 96%   BMI 24.11 kg/m²   Smoking Status Former   BSA 2.02 m²      PHYSICAL EXAM:   General: well appearing  male, NAD, pleasant and engaged in encounter    HEENT: NCAT, MMM  CV: RRR, no m/r/g  PULM: CTAB, non-labored respirations   ABD: soft, NT, ND, + bowel sounds   : no suprapubic or CVA tenderness   EXT: WWP, trace non-pitting LE edema (symmetric bilaterally)   SKIN: L forearm birth vianney  NEURO: A&Ox4, symmetric facies, no gross motor or sensory deficits, normal gait  PSYCH: pleasant mood, appropriate affect     Assessment/Plan    Gurdeep Tracy is a 64 y.o. male [place] [facility] [place]ui Lee on 03/28/25 for routine care, as well as for management of the following chronic medical conditions: DLD/CAD (elevated CAC scoring), unilateral hearing loss, HTN, Hypothyroidism. Patient presents today for CPE.     #R Inguinal Hernia   - seen by Dr. Delgado   - considering surgery but ongoing surveillance and defers any intervention for now     #Elevated CAC scoring, CAD, DLD   - score 267 in 2018; 372 per 2024 scan   - Moderate intensity statin, Atorva 20   - seen by cardiology in 2024 for syncope as below   [  ] updated labs; goal LDL <70; titrate Atorva if needed to achieve goal   - reassuring stress testing 2024  [  ] continue daily ASA     #Unilateral hearing loss 8.2022 s/p ENT referral, neg MRI IAC 9.22 with some recovery s/p steroid injxns with ENT (Keke)   [  ] consider hearing assistive devices; has follow up with audiology and ENT pending      #R shoulder OA, Cervical radiculopathy with  "prominent R sided symptoms early  s/p referral to Dr. Dixon and subsequent cervical spine surgery 4.   - s/p C5-7 ACDF     #Herpes zoster    - s/p Shingrix vaccine series       #HTN,  lone episode remote AF (iso alcohol intake), Syncope (thought to be vasovagal)  - Lisinopril 20mg daily   - Episode of syncope in   - seen by cardiology, Dr. Meadows   - per cardiology note: \"getting up after squatting on the floor for couple of minutes.  He remembers standing up but does not remember actually falling having hit his head with a small hematoma. His wife came came around immediately, to find him sitting up, trying to gather himself.  Within the few minutes he is back to his usual self.  His lisinopril dose was reduced to 10 mg daily from 20 mg\"  - reassuring EKG per  visit  - Holter reassuring   - Stress echo reassuring    [  ] labs  [  ] med refills     #HyperCa   - resolved per recent labs  [  ] updated CMP today, consider PTH with labs--ordered today, if elevated or upper limit of normal, pursue urine testing and DEXA      #Hypothyroidism   - ltx 200 mcg   [  ] due labs   [  ] refill      #Chronic LBP      #Cataracts s/p phaco, Uveitis History     #Health Maintenance    Cancer Screening  - Colorectal Cancer Screenin (Phil); repeat due  (family history)   - Lung Cancer Screening: non-smoker   - Prostate Cancer Screening: labs today     Laboratory Screening  - Lipid Screen: labs today   - ASCVD Score: labs today   - A1C, glucose screen: labs today   - STI, HIV, Hep B screen: defers  - Hep C screen: defers    Imaging Screening  - AAA screening: discuss today, ordered     Immunizations:   - Influenza: advise annually  - COVID: advise staying UTD with boosters  - Tdap: due   - Prevnar, Pneumovax: PCV-20 today   - Shingrix: series complete   - MMR: prior history of confirmed infection in childhood     Other Screening  - Health Literacy Assessment: excellent   - Depression screen: " negative   - Home safety/partner violence screen: no concerns   - Hearing/Vision screens: follows with optho and ENT/audiology  - Alcohol/tobacco/drug use screen: non-smoker   - Healthcare POA/Advanced Directives: wife     Return to clinic annually for follow-up/CPE, sooner if acute issues arise.     Patient Discussion:    Please call back the office with any questions at [phone]. In the case of an emergency, please call 911 or go to the nearest Emergency Department.      Rui Lee MD  Internal Medicine-Pediatrics  [facility] [address], [address]  P: [phone], F: [phone]

## 2025-03-30 DIAGNOSIS — E03.9 HYPOTHYROIDISM, UNSPECIFIED TYPE: ICD-10-CM

## 2025-03-31 ENCOUNTER — PHARMACY VISIT (OUTPATIENT)
Dept: PHARMACY | Facility: CLINIC | Age: 65
End: 2025-03-31
Payer: COMMERCIAL

## 2025-04-01 PROCEDURE — RXMED WILLOW AMBULATORY MEDICATION CHARGE

## 2025-04-01 RX ORDER — LEVOTHYROXINE SODIUM 200 UG/1
200 TABLET ORAL DAILY
Qty: 90 TABLET | Refills: 0 | Status: SHIPPED | OUTPATIENT
Start: 2025-04-01 | End: 2026-04-01

## 2025-04-02 ENCOUNTER — PHARMACY VISIT (OUTPATIENT)
Dept: PHARMACY | Facility: CLINIC | Age: 65
End: 2025-04-02
Payer: COMMERCIAL

## 2025-04-02 LAB
25(OH)D3+25(OH)D2 SERPL-MCNC: 49 NG/ML (ref 30–100)
ALBUMIN SERPL-MCNC: 4.6 G/DL (ref 3.6–5.1)
ALP SERPL-CCNC: 46 U/L (ref 35–144)
ALT SERPL-CCNC: 32 U/L (ref 9–46)
ANION GAP SERPL CALCULATED.4IONS-SCNC: 6 MMOL/L (CALC) (ref 7–17)
AST SERPL-CCNC: 25 U/L (ref 10–35)
BASOPHILS # BLD AUTO: 20 CELLS/UL (ref 0–200)
BASOPHILS NFR BLD AUTO: 0.4 %
BILIRUB SERPL-MCNC: 0.6 MG/DL (ref 0.2–1.2)
BUN SERPL-MCNC: 25 MG/DL (ref 7–25)
CALCIUM SERPL-MCNC: 9.8 MG/DL (ref 8.6–10.3)
CHLORIDE SERPL-SCNC: 103 MMOL/L (ref 98–110)
CHOLEST SERPL-MCNC: 160 MG/DL
CHOLEST/HDLC SERPL: 3 (CALC)
CO2 SERPL-SCNC: 27 MMOL/L (ref 20–32)
CREAT SERPL-MCNC: 1.09 MG/DL (ref 0.7–1.35)
EGFRCR SERPLBLD CKD-EPI 2021: 75 ML/MIN/1.73M2
EOSINOPHIL # BLD AUTO: 51 CELLS/UL (ref 15–500)
EOSINOPHIL NFR BLD AUTO: 1 %
ERYTHROCYTE [DISTWIDTH] IN BLOOD BY AUTOMATED COUNT: 12.1 % (ref 11–15)
EST. AVERAGE GLUCOSE BLD GHB EST-MCNC: 111 MG/DL
EST. AVERAGE GLUCOSE BLD GHB EST-SCNC: 6.2 MMOL/L
GLUCOSE SERPL-MCNC: 101 MG/DL (ref 65–99)
HBA1C MFR BLD: 5.5 % OF TOTAL HGB
HCT VFR BLD AUTO: 44.1 % (ref 38.5–50)
HDLC SERPL-MCNC: 53 MG/DL
HGB BLD-MCNC: 14.9 G/DL (ref 13.2–17.1)
LDLC SERPL CALC-MCNC: 88 MG/DL (CALC)
LYMPHOCYTES # BLD AUTO: 1979 CELLS/UL (ref 850–3900)
LYMPHOCYTES NFR BLD AUTO: 38.8 %
MCH RBC QN AUTO: 31 PG (ref 27–33)
MCHC RBC AUTO-ENTMCNC: 33.8 G/DL (ref 32–36)
MCV RBC AUTO: 91.9 FL (ref 80–100)
MONOCYTES # BLD AUTO: 393 CELLS/UL (ref 200–950)
MONOCYTES NFR BLD AUTO: 7.7 %
NEUTROPHILS # BLD AUTO: 2657 CELLS/UL (ref 1500–7800)
NEUTROPHILS NFR BLD AUTO: 52.1 %
NONHDLC SERPL-MCNC: 107 MG/DL (CALC)
PLATELET # BLD AUTO: 257 THOUSAND/UL (ref 140–400)
PMV BLD REES-ECKER: 10 FL (ref 7.5–12.5)
POTASSIUM SERPL-SCNC: 4.3 MMOL/L (ref 3.5–5.3)
PROT SERPL-MCNC: 6.9 G/DL (ref 6.1–8.1)
PSA FREE MFR SERPL: 33 % (CALC)
PSA FREE SERPL-MCNC: 0.2 NG/ML
PSA SERPL-MCNC: 0.6 NG/ML
PTH-INTACT SERPL-MCNC: 23 PG/ML (ref 16–77)
RBC # BLD AUTO: 4.8 MILLION/UL (ref 4.2–5.8)
SODIUM SERPL-SCNC: 136 MMOL/L (ref 135–146)
TRIGL SERPL-MCNC: 96 MG/DL
TSH SERPL-ACNC: 0.82 MIU/L (ref 0.4–4.5)
WBC # BLD AUTO: 5.1 THOUSAND/UL (ref 3.8–10.8)

## 2025-04-02 PROCEDURE — RXMED WILLOW AMBULATORY MEDICATION CHARGE

## 2025-04-04 ENCOUNTER — HOSPITAL ENCOUNTER (OUTPATIENT)
Dept: VASCULAR MEDICINE | Facility: HOSPITAL | Age: 65
Discharge: HOME | End: 2025-04-04
Payer: COMMERCIAL

## 2025-04-04 DIAGNOSIS — Z13.6 ENCOUNTER FOR ABDOMINAL AORTIC ANEURYSM (AAA) SCREENING: ICD-10-CM

## 2025-04-04 PROCEDURE — 76706 US ABDL AORTA SCREEN AAA: CPT | Performed by: INTERNAL MEDICINE

## 2025-04-04 PROCEDURE — 76706 US ABDL AORTA SCREEN AAA: CPT

## 2025-04-15 ENCOUNTER — APPOINTMENT (OUTPATIENT)
Dept: OTOLARYNGOLOGY | Facility: CLINIC | Age: 65
End: 2025-04-15
Payer: COMMERCIAL

## 2025-04-15 ENCOUNTER — CLINICAL SUPPORT (OUTPATIENT)
Dept: AUDIOLOGY | Facility: CLINIC | Age: 65
End: 2025-04-15
Payer: COMMERCIAL

## 2025-04-15 VITALS — BODY MASS INDEX: 24.01 KG/M2 | WEIGHT: 177 LBS

## 2025-04-15 DIAGNOSIS — H90.3 SENSORINEURAL HEARING LOSS (SNHL) OF BOTH EARS: Primary | ICD-10-CM

## 2025-04-15 DIAGNOSIS — H90.42 SENSORINEURAL HEARING LOSS (SNHL) OF LEFT EAR WITH UNRESTRICTED HEARING OF RIGHT EAR: ICD-10-CM

## 2025-04-15 PROCEDURE — 92550 TYMPANOMETRY & REFLEX THRESH: CPT | Mod: 52 | Performed by: AUDIOLOGIST

## 2025-04-15 PROCEDURE — 99213 OFFICE O/P EST LOW 20 MIN: CPT | Performed by: OTOLARYNGOLOGY

## 2025-04-15 PROCEDURE — 1159F MED LIST DOCD IN RCRD: CPT | Performed by: OTOLARYNGOLOGY

## 2025-04-15 PROCEDURE — 1160F RVW MEDS BY RX/DR IN RCRD: CPT | Performed by: OTOLARYNGOLOGY

## 2025-04-15 PROCEDURE — 92557 COMPREHENSIVE HEARING TEST: CPT | Performed by: AUDIOLOGIST

## 2025-04-15 ASSESSMENT — PAIN SCALES - GENERAL: PAINLEVEL_OUTOF10: 0 - NO PAIN

## 2025-04-15 ASSESSMENT — PAIN - FUNCTIONAL ASSESSMENT: PAIN_FUNCTIONAL_ASSESSMENT: 0-10

## 2025-04-15 ASSESSMENT — PATIENT HEALTH QUESTIONNAIRE - PHQ9
2. FEELING DOWN, DEPRESSED OR HOPELESS: NOT AT ALL
SUM OF ALL RESPONSES TO PHQ9 QUESTIONS 1 AND 2: 0
1. LITTLE INTEREST OR PLEASURE IN DOING THINGS: NOT AT ALL

## 2025-04-15 NOTE — PROGRESS NOTES
HISTORY:  History of sudden left sided hearing loss and intratympanic injections.    His previous hearing test was 2022.  He feels that his hearing has decreased.    He is considering hearing aids at this time.    No pain  No aural fullness  Constant tinnitus in both ears.  No vertigo  He has passed out over the past year.  His blood pressure medication has been adjusted and he has been fine since.      RESULTS:  Otoscopic inspection showed clear ear canals bilaterally.    Immittance testing showed normal tympanograms bilaterally.   Ipsilateral acoustic reflexes were tested at 500-4000Hz in both ears.  They were present at 500-4000Hz in both ears.    Pure tone air and bone conduction testing showed normal hearing at 125-750Hz sloping to mild at 1000Hz rising to normal at 4000Hz sloping to a mild sensorineural hearing loss 8000Hz in the right ear and a mild sensorineural hearing loss at 125-8000Hz in the left ear.   Word discrimination scores were excellent bilaterally.  A QuickSin was completed at 70dB in each ear and showed a mild SNR loss in the right ear and a normal SNR loss in the left ear.      IMPRESSIONS:  Narayan has a mild sensorineural hearing loss in both ears, worse in the left ear.  He is a candidate for hearing aids at this time.    He will consider scheduling a hearing aid consultation after his visit with Dr. Davies today.      Treatment Plan:   Follow up with Dr. Davies.   Medical Clearance for hearing aids.   Submit Prior authorization for bilateral hearing aids to his insurance     TIME:  6909-1684

## 2025-05-19 DIAGNOSIS — E78.5 DYSLIPIDEMIA: ICD-10-CM

## 2025-05-19 PROCEDURE — RXMED WILLOW AMBULATORY MEDICATION CHARGE

## 2025-05-19 RX ORDER — ATORVASTATIN CALCIUM 40 MG/1
40 TABLET, FILM COATED ORAL DAILY
Qty: 90 TABLET | Refills: 3 | Status: SHIPPED | OUTPATIENT
Start: 2025-05-19 | End: 2026-05-14

## 2025-05-21 ENCOUNTER — PHARMACY VISIT (OUTPATIENT)
Dept: PHARMACY | Facility: CLINIC | Age: 65
End: 2025-05-21
Payer: COMMERCIAL

## 2025-05-28 ENCOUNTER — APPOINTMENT (OUTPATIENT)
Dept: OPHTHALMOLOGY | Facility: CLINIC | Age: 65
End: 2025-05-28
Payer: COMMERCIAL

## 2025-05-28 DIAGNOSIS — H35.342 MACULAR HOLE OF LEFT EYE: Primary | ICD-10-CM

## 2025-05-28 PROCEDURE — 92134 CPTRZ OPH DX IMG PST SGM RTA: CPT | Performed by: OPHTHALMOLOGY

## 2025-05-28 PROCEDURE — 99214 OFFICE O/P EST MOD 30 MIN: CPT | Performed by: OPHTHALMOLOGY

## 2025-05-28 ASSESSMENT — REFRACTION_MANIFEST
OD_ADD: +2.50
OD_CYLINDER: -0.75
OD_AXIS: 075
OS_ADD: +2.50
OS_SPHERE: +1.25
OS_AXIS: 076
OS_CYLINDER: -1.00
OD_SPHERE: +1.25

## 2025-05-28 ASSESSMENT — ENCOUNTER SYMPTOMS
NEUROLOGICAL NEGATIVE: 0
PSYCHIATRIC NEGATIVE: 0
EYES NEGATIVE: 1
HEMATOLOGIC/LYMPHATIC NEGATIVE: 0
CONSTITUTIONAL NEGATIVE: 0
MUSCULOSKELETAL NEGATIVE: 0
RESPIRATORY NEGATIVE: 0
ALLERGIC/IMMUNOLOGIC NEGATIVE: 0
ENDOCRINE NEGATIVE: 0
CARDIOVASCULAR NEGATIVE: 0
GASTROINTESTINAL NEGATIVE: 0

## 2025-05-28 ASSESSMENT — REFRACTION_WEARINGRX
OD_AXIS: 067
OD_ADD: +2.50
OS_ADD: +2.50
OD_CYLINDER: -0.50
OS_CYLINDER: SPHER
OS_SPHERE: +0.25
OD_SPHERE: +1.00

## 2025-05-28 ASSESSMENT — EXTERNAL EXAM - RIGHT EYE: OD_EXAM: NORMAL

## 2025-05-28 ASSESSMENT — CONF VISUAL FIELD
OD_NORMAL: 1
METHOD: COUNTING FINGERS
OD_INFERIOR_TEMPORAL_RESTRICTION: 0
OS_NORMAL: 1
OD_SUPERIOR_TEMPORAL_RESTRICTION: 0
OS_INFERIOR_NASAL_RESTRICTION: 0
OS_INFERIOR_TEMPORAL_RESTRICTION: 0
OD_INFERIOR_NASAL_RESTRICTION: 0
OS_SUPERIOR_NASAL_RESTRICTION: 0
OS_SUPERIOR_TEMPORAL_RESTRICTION: 0
OD_SUPERIOR_NASAL_RESTRICTION: 0

## 2025-05-28 ASSESSMENT — VISUAL ACUITY
OS_PH_CC+: -2
OS_PH_CC: 20/50
OD_CC+: -2
OD_CC: 20/20
OS_CC+: -2
METHOD: SNELLEN - LINEAR
OS_CC: 20/70
CORRECTION_TYPE: GLASSES

## 2025-05-28 ASSESSMENT — EXTERNAL EXAM - LEFT EYE: OS_EXAM: NORMAL

## 2025-05-28 ASSESSMENT — TONOMETRY
IOP_METHOD: GOLDMANN APPLANATION
OD_IOP_MMHG: 13
OS_IOP_MMHG: 15

## 2025-05-28 ASSESSMENT — CUP TO DISC RATIO
OS_RATIO: .3
OD_RATIO: .3

## 2025-05-28 ASSESSMENT — SLIT LAMP EXAM - LIDS
COMMENTS: GOOD POSITION
COMMENTS: GOOD POSITION

## 2025-05-28 NOTE — PROGRESS NOTES
Assessment/Plan   Diagnoses and all orders for this visit:  Macular hole of left eye  -     OCT, Retina - OU - Both Eyes  Retina consult

## 2025-06-06 ENCOUNTER — APPOINTMENT (OUTPATIENT)
Dept: OPHTHALMOLOGY | Facility: CLINIC | Age: 65
End: 2025-06-06
Payer: COMMERCIAL

## 2025-06-06 DIAGNOSIS — H43.391 VITREOUS FLOATERS OF RIGHT EYE: Primary | ICD-10-CM

## 2025-06-06 DIAGNOSIS — Z96.1 PSEUDOPHAKIA OF RIGHT EYE: ICD-10-CM

## 2025-06-06 DIAGNOSIS — H35.342 MACULAR HOLE OF LEFT EYE: ICD-10-CM

## 2025-06-06 PROCEDURE — 99214 OFFICE O/P EST MOD 30 MIN: CPT | Performed by: OPHTHALMOLOGY

## 2025-06-06 PROCEDURE — 92250 FUNDUS PHOTOGRAPHY W/I&R: CPT | Performed by: OPHTHALMOLOGY

## 2025-06-06 ASSESSMENT — ENCOUNTER SYMPTOMS: HEMATOLOGIC/LYMPHATIC NEGATIVE: 1

## 2025-06-06 ASSESSMENT — VISUAL ACUITY
METHOD: SNELLEN - LINEAR
OS_CC: 20/80+1
CORRECTION_TYPE: GLASSES
OD_CC: 20/20-1
OS_PH_CC: 20/60-2

## 2025-06-06 ASSESSMENT — TONOMETRY
OD_IOP_MMHG: 15
IOP_METHOD: GOLDMANN APPLANATION
OS_IOP_MMHG: 15

## 2025-06-06 ASSESSMENT — SLIT LAMP EXAM - LIDS
COMMENTS: GOOD POSITION
COMMENTS: GOOD POSITION

## 2025-06-06 ASSESSMENT — CUP TO DISC RATIO
OS_RATIO: .3
OD_RATIO: .3

## 2025-06-06 ASSESSMENT — EXTERNAL EXAM - RIGHT EYE: OD_EXAM: NORMAL

## 2025-06-06 ASSESSMENT — EXTERNAL EXAM - LEFT EYE: OS_EXAM: NORMAL

## 2025-06-06 NOTE — PROGRESS NOTES
Assessment/Plan   Diagnoses and all orders for this visit:  Vitreous floaters of right eye  Macular hole of left eye  Pseudophakia of right eye        He most likely has a closed hole in macula left        He will benefit form glasses left new    DIAGNOSTIC PROCEDURE DONE    OCT DONE OD/OS            REASON FOR TEST: will help address and tailor  therapy by detecting subclinical CME SRF     Hi quality OCT  scans obtained  signal good    OCT OD - Normal Foveal Contour, No Edema, IS/OS Junction Normal  OCT OS - Normal Foveal Contour, No Edema, IS/OS Junction abnormal    additional commnents:    Plan no surgery recommended  The risks and benefits of vitreoretinal surgery was explained clearly.Risks include loss of vision even permanently. Infections, discomfort form sutures and hemorrhage as well as retinal detachment were explained.    No need for surgery    3m

## 2025-06-22 DIAGNOSIS — E03.9 HYPOTHYROIDISM, UNSPECIFIED TYPE: ICD-10-CM

## 2025-06-22 PROCEDURE — RXMED WILLOW AMBULATORY MEDICATION CHARGE

## 2025-06-23 RX ORDER — LEVOTHYROXINE SODIUM 200 UG/1
200 TABLET ORAL DAILY
Qty: 90 TABLET | Refills: 3 | Status: SHIPPED | OUTPATIENT
Start: 2025-06-23 | End: 2026-06-23

## 2025-06-24 ENCOUNTER — PHARMACY VISIT (OUTPATIENT)
Dept: PHARMACY | Facility: CLINIC | Age: 65
End: 2025-06-24
Payer: COMMERCIAL

## 2025-06-24 PROCEDURE — RXMED WILLOW AMBULATORY MEDICATION CHARGE

## 2025-06-25 ENCOUNTER — APPOINTMENT (OUTPATIENT)
Dept: DERMATOLOGY | Facility: CLINIC | Age: 65
End: 2025-06-25
Payer: COMMERCIAL

## 2025-06-25 ENCOUNTER — PHARMACY VISIT (OUTPATIENT)
Dept: PHARMACY | Facility: CLINIC | Age: 65
End: 2025-06-25
Payer: COMMERCIAL

## 2025-06-25 DIAGNOSIS — L82.1 SEBORRHEIC KERATOSIS: ICD-10-CM

## 2025-06-25 DIAGNOSIS — Z12.83 SCREENING EXAM FOR SKIN CANCER: ICD-10-CM

## 2025-06-25 DIAGNOSIS — D18.01 HEMANGIOMA OF SKIN: ICD-10-CM

## 2025-06-25 DIAGNOSIS — B35.1 ONYCHOMYCOSIS: Primary | ICD-10-CM

## 2025-06-25 DIAGNOSIS — L81.4 LENTIGO: ICD-10-CM

## 2025-06-25 DIAGNOSIS — Q82.5 PORT WINE STAIN: ICD-10-CM

## 2025-06-25 DIAGNOSIS — L57.9 SKIN CHANGES DUE TO CHRONIC EXPOSURE TO NONIONIZING RADIATION: ICD-10-CM

## 2025-06-25 DIAGNOSIS — D22.9 MELANOCYTIC NEVUS, UNSPECIFIED LOCATION: ICD-10-CM

## 2025-06-25 PROCEDURE — 1159F MED LIST DOCD IN RCRD: CPT | Performed by: STUDENT IN AN ORGANIZED HEALTH CARE EDUCATION/TRAINING PROGRAM

## 2025-06-25 PROCEDURE — RXMED WILLOW AMBULATORY MEDICATION CHARGE

## 2025-06-25 PROCEDURE — 99204 OFFICE O/P NEW MOD 45 MIN: CPT | Performed by: STUDENT IN AN ORGANIZED HEALTH CARE EDUCATION/TRAINING PROGRAM

## 2025-06-25 RX ORDER — TERBINAFINE HYDROCHLORIDE 250 MG/1
TABLET ORAL
Qty: 42 TABLET | Refills: 1 | Status: SHIPPED | OUTPATIENT
Start: 2025-06-25

## 2025-06-25 NOTE — Clinical Note
Pt reports that it has been present for most of his life  He is not bothered by it   Offered that he can have it removed with laser in the future if he wishes

## 2025-06-25 NOTE — PROGRESS NOTES
"Montez Tracy \"Narayan\" is a 64 y.o. adult who presents for the following: Skin Check (FBSE. Lesions of concern right lower abdomen and back. ).          Review of Systems:  No other skin or systemic complaints other than what is documented elsewhere in the note.    The following portions of the chart were reviewed this encounter and updated as appropriate:         Skin Cancer History  Biopsy Log Book  No skin cancers from Specimen Tracking.    Additional History      Specialty Problems          Dermatology Problems    Other seborrheic keratosis    Tinea versicolor        Objective   Well appearing patient in no apparent distress; mood and affect are within normal limits.    A full examination was performed including scalp, head, eyes, ears, nose, lips, neck, chest, axillae, abdomen, back, buttocks, bilateral upper extremities, bilateral lower extremities, hands, feet, fingers, toes, fingernails, and toenails. All findings within normal limits unless otherwise noted below.    Assessment/Plan   Skin Exam  1. ONYCHOMYCOSIS  Left Hallux Toenail  White nail discoloration on the toe nails  No maceration or scale noted on the foot and interdigits  We reviewed treatment option with oral terbinafine   Reviewed side effects including acute liver failure, GI upset, yellowing of the skin. Advised abstaining from alcohol while on terbinafine   LFTs from 4/2025 WNL  Discussed high risk of recurrence. Advised to wash socks and shoes while on the medication to eradicate yeast at home and help prevent recurrence  Start terbinafine 250 mg daily x12 weeks, will get repeat LFTs in 6 weeks    terbinafine (LamISIL) 250 mg tablet - Left Hallux Toenail  Take one pill by mouth once daily. Do not drink alcohol.    Comprehensive metabolic panel - Left Hallux Toenail  2. MELANOCYTIC NEVUS, UNSPECIFIED LOCATION  Generalized  Benign to slightly atypical appearing brown pigmented macules and papules  Clinically benign appearing nevi, " reassurance provided to the patient today. Discussed important of sun protection with sun protective clothing and/or broad spectrum sunscreen spf 30 or above.  ABCDEs of melanoma reviewed. Patient to f/u should they notice any new or changing pre-existing skin lesion.  3. SEBORRHEIC KERATOSIS  Generalized  Stuck on verrucous, tan-brown papules and plaques.    The benign nature of these skin lesions were reviewed with the patient today and reassurance was provided. Patient advised to return for f/u if the lesions change in size, shape, color, become painful, tender, itch or bleed.  4. HEMANGIOMA OF SKIN  Generalized  Bright red papules  Discussed benign nature of condition, reassured. Reviewed warning signs of skin cancer with patient.  5. LENTIGO  Generalized  Scattered tan macules in sun-exposed areas.  Benign nature of these skin lesions reviewed and relation to sun exposure discussed. Reassurance provided. Reviewed warning signs of skin cancer.  6. SKIN CHANGES DUE TO CHRONIC EXPOSURE TO NONIONIZING RADIATION  Generalized  Mottled pigmentation, telangiectasias and brown reticular macules in sun exposed areas on the face, extremities, trunk  The risk of chronic, cumulative sun damage and risk of development of skin cancer was reviewed with the patient today. Discussed important of sun protection with sun protective clothing and/or broad spectrum sunscreen spf 30 or above.  Warning signs of skin cancer were reviewed. Patient to contact office should they notice any new or changing pre-existing skin lesion.  7. SCREENING EXAM FOR SKIN CANCER      Related Procedures  Follow Up In Dermatology - Established Patient  8. PORT WINE STAIN  Left Forearm - Posterior  Red-purple circular patches   Pt reports that it has been present for most of his life  He is not bothered by it   Offered that he can have it removed with laser in the future if he wishes    RTC 1-2 years    Patient seen and discussed with Emily De La Paz  MD Jc MPH  PGY-2, Department of Dermatology    I saw and evaluated the patient. I personally obtained the key and critical portions of the history and physical exam or was physically present for key and critical portions performed by the resident. I reviewed the resident's documentation and discussed the patient with the resident. I agree with the resident's medical decision making as documented in the note.    Emelia Herr MD

## 2025-06-25 NOTE — Clinical Note
We reviewed treatment option with oral terbinafine   Reviewed side effects including acute liver failure, GI upset, yellowing of the skin. Advised abstaining from alcohol while on terbinafine   LFTs from 4/2025 WNL  Discussed high risk of recurrence. Advised to wash socks and shoes while on the medication to eradicate yeast at home and help prevent recurrence  Start terbinafine 250 mg daily x12 weeks, will get repeat LFTs in 6 weeks

## 2025-06-25 NOTE — Clinical Note
White nail discoloration on the toe nails  No maceration or scale noted on the foot and interdigits

## 2025-07-24 ENCOUNTER — PHARMACY VISIT (OUTPATIENT)
Dept: PHARMACY | Facility: CLINIC | Age: 65
End: 2025-07-24
Payer: COMMERCIAL

## 2025-07-24 PROCEDURE — RXMED WILLOW AMBULATORY MEDICATION CHARGE

## 2025-07-25 DIAGNOSIS — B35.1 ONYCHOMYCOSIS: ICD-10-CM

## 2025-08-12 ENCOUNTER — PHARMACY VISIT (OUTPATIENT)
Dept: PHARMACY | Facility: CLINIC | Age: 65
End: 2025-08-12
Payer: COMMERCIAL

## 2025-08-12 LAB
ALBUMIN SERPL-MCNC: 4.5 G/DL (ref 3.6–5.1)
ALP SERPL-CCNC: 42 U/L (ref 35–144)
ALT SERPL-CCNC: 24 U/L (ref 9–46)
ANION GAP SERPL CALCULATED.4IONS-SCNC: 7 MMOL/L (CALC) (ref 7–17)
AST SERPL-CCNC: 22 U/L (ref 10–35)
BILIRUB SERPL-MCNC: 0.5 MG/DL (ref 0.2–1.2)
BUN SERPL-MCNC: 19 MG/DL (ref 7–25)
CALCIUM SERPL-MCNC: 9.6 MG/DL (ref 8.6–10.3)
CHLORIDE SERPL-SCNC: 104 MMOL/L (ref 98–110)
CO2 SERPL-SCNC: 27 MMOL/L (ref 20–32)
CREAT SERPL-MCNC: 1.08 MG/DL (ref 0.7–1.35)
EGFRCR SERPLBLD CKD-EPI 2021: 76 ML/MIN/1.73M2
GLUCOSE SERPL-MCNC: 91 MG/DL (ref 65–99)
POTASSIUM SERPL-SCNC: 4.2 MMOL/L (ref 3.5–5.3)
PROT SERPL-MCNC: 6.5 G/DL (ref 6.1–8.1)
SODIUM SERPL-SCNC: 138 MMOL/L (ref 135–146)

## 2025-08-12 PROCEDURE — RXMED WILLOW AMBULATORY MEDICATION CHARGE

## 2025-08-18 ENCOUNTER — PHARMACY VISIT (OUTPATIENT)
Dept: PHARMACY | Facility: CLINIC | Age: 65
End: 2025-08-18
Payer: COMMERCIAL

## 2025-08-18 PROCEDURE — RXMED WILLOW AMBULATORY MEDICATION CHARGE

## 2025-09-12 ENCOUNTER — APPOINTMENT (OUTPATIENT)
Dept: OPHTHALMOLOGY | Facility: CLINIC | Age: 65
End: 2025-09-12
Payer: COMMERCIAL

## 2025-09-17 ENCOUNTER — APPOINTMENT (OUTPATIENT)
Dept: OPHTHALMOLOGY | Facility: CLINIC | Age: 65
End: 2025-09-17
Payer: COMMERCIAL

## 2026-04-03 ENCOUNTER — APPOINTMENT (OUTPATIENT)
Dept: PRIMARY CARE | Facility: CLINIC | Age: 66
End: 2026-04-03
Payer: COMMERCIAL

## 2026-06-24 ENCOUNTER — APPOINTMENT (OUTPATIENT)
Dept: DERMATOLOGY | Facility: CLINIC | Age: 66
End: 2026-06-24
Payer: COMMERCIAL